# Patient Record
Sex: MALE | Race: BLACK OR AFRICAN AMERICAN | NOT HISPANIC OR LATINO | Employment: STUDENT | URBAN - METROPOLITAN AREA
[De-identification: names, ages, dates, MRNs, and addresses within clinical notes are randomized per-mention and may not be internally consistent; named-entity substitution may affect disease eponyms.]

---
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
## 2023-05-18 ENCOUNTER — HOSPITAL ENCOUNTER (EMERGENCY)
Facility: HOSPITAL | Age: 23
Discharge: HOME | End: 2023-05-18
Attending: EMERGENCY MEDICINE
Payer: MEDICAID

## 2023-05-18 VITALS
SYSTOLIC BLOOD PRESSURE: 127 MMHG | BODY MASS INDEX: 19.1 KG/M2 | TEMPERATURE: 98 F | WEIGHT: 126 LBS | RESPIRATION RATE: 18 BRPM | HEIGHT: 68 IN | DIASTOLIC BLOOD PRESSURE: 57 MMHG | OXYGEN SATURATION: 98 % | HEART RATE: 60 BPM

## 2023-05-18 DIAGNOSIS — S61.209A AVULSION OF FINGER, INITIAL ENCOUNTER: Primary | ICD-10-CM

## 2023-05-18 DIAGNOSIS — S69.90XA INJURY OF INDEX FINGER, INITIAL ENCOUNTER: ICD-10-CM

## 2023-05-18 PROCEDURE — 99284 EMERGENCY DEPT VISIT MOD MDM: CPT | Mod: ,,, | Performed by: EMERGENCY MEDICINE

## 2023-05-18 PROCEDURE — 90715 TDAP VACCINE 7 YRS/> IM: CPT | Performed by: PHYSICIAN ASSISTANT

## 2023-05-18 PROCEDURE — 99284 EMERGENCY DEPT VISIT MOD MDM: CPT

## 2023-05-18 PROCEDURE — 63600175 PHARM REV CODE 636 W HCPCS: Performed by: PHYSICIAN ASSISTANT

## 2023-05-18 PROCEDURE — 99284 PR EMERGENCY DEPT VISIT,LEVEL IV: ICD-10-PCS | Mod: ,,, | Performed by: EMERGENCY MEDICINE

## 2023-05-18 PROCEDURE — 90471 IMMUNIZATION ADMIN: CPT | Performed by: PHYSICIAN ASSISTANT

## 2023-05-18 RX ADMIN — TETANUS TOXOID, REDUCED DIPHTHERIA TOXOID AND ACELLULAR PERTUSSIS VACCINE, ADSORBED 0.5 ML: 5; 2.5; 8; 8; 2.5 SUSPENSION INTRAMUSCULAR at 06:05

## 2023-05-18 NOTE — Clinical Note
"Amador"Matt Bejarano was seen and treated in our emergency department on 5/18/2023.  He may return to work on 05/19/2023.       If you have any questions or concerns, please don't hesitate to call.      Nick Pizarro PA-C"

## 2023-05-18 NOTE — DISCHARGE INSTRUCTIONS
Change your dressings twice daily or if it becomes wet.  Apply topical antibiotic ointment whenever you change the dressing (Neosporin, Bacitracin, etc).  Take over-the-counter Tylenol and Ibuprofen as needed for pain.   Use the below contact information to follow-up with our Orthopedic department.     Follow-up with your primary care provider for further evaluation.     Return to the emergency room for new, worsening, or concerning symptoms.

## 2023-05-18 NOTE — ED NOTES
Amador Bejarano, a 23 y.o. male presents to the ED w/ complaint of lac to left index finger     Triage note:  Chief Complaint   Patient presents with    Laceration     L index finger  with knife     Review of patient's allergies indicates:   Allergen Reactions    Zithromax [azithromycin]      No past medical history on file.    LOC: Patient name and date of birth verified. The patient is awake, alert and aware of environment with an appropriate affect, the patient is oriented x 3 and speaking appropriately.   APPEARANCE: Patient resting comfortably, patient is clean and well groomed, patient's clothing is properly fastened.  SKIN: The skin is warm and dry, color consistent with ethnicity, patient has normal skin turgor and moist mucus membranes, skin intact, no breakdown or bruising noted.  MUSCULOSKELETAL: Patient moving all extremities well, no obvious swelling or deformities noted.   RESPIRATORY: Respirations are spontaneous, patient has a normal effort and rate, no accessory muscle use noted.  CARDIAC: Patient has a normal rate and rhythm, no periphreal edema noted, capillary refill < 3 seconds.  ABDOMEN: Soft and non tender to palpation, no distention noted. Bowel sounds present in all four quadrants.  NEUROLOGIC: Eyes open spontaneously, behavior appropriate to situation, follows commands, facial expression symmetrical, bilateral hand grasp equal and even, purposeful motor response noted, normal sensation in all extremities when touched with a finger.

## 2023-05-18 NOTE — ED PROVIDER NOTES
Encounter Date: 5/18/2023       History     Chief Complaint   Patient presents with    Laceration     L index finger  with knife     The history is provided by the patient and medical records. No  was used.     Amador Bejarano is a 23 y.o. male with no reported medical history presenting to the ED with the chief complaint of left index finger laceration.     Accidentally cut the tip of his L index finger with a knife while chopping vegetables about 2 hours PTA. Denies ROM deficits. No daily medications or blood thinner use. Denies other bodily injury. Does not recall his last tetanus.     Review of patient's allergies indicates:   Allergen Reactions    Zithromax [azithromycin]      No past medical history on file.  No past surgical history on file.  Family History   Problem Relation Age of Onset    Hypertension Paternal Grandmother      Social History     Tobacco Use    Smoking status: Never   Substance Use Topics    Alcohol use: Never    Drug use: Never     Review of Systems   Skin:  Positive for wound.     Physical Exam     Initial Vitals [05/18/23 1726]   BP Pulse Resp Temp SpO2   (!) 127/57 60 18 98.3 °F (36.8 °C) 98 %      MAP       --         Physical Exam    Constitutional: He appears well-developed and well-nourished. He is not diaphoretic. No distress.   HENT:   Head: Normocephalic and atraumatic.   Mouth/Throat: Oropharynx is clear and moist.   Eyes: EOM are normal. Pupils are equal, round, and reactive to light.   Neck:   Normal range of motion.  Cardiovascular:  Normal rate and regular rhythm.           Pulmonary/Chest: No respiratory distress.   Speaking full sentences without difficulty. No accessory muscle use.   Musculoskeletal:         General: Normal range of motion.      Cervical back: Normal range of motion.      Comments: Small avulsion injury involving radial aspect of distal phalanx of L index finger involving fingernail. Bleeding controlled. No exposed bone. Intact DIP,  PIP, MCP ROM.     Neurological: He is alert and oriented to person, place, and time.   Skin: Skin is warm and dry. No rash noted.     Left index finger:          ED Course   Procedures  Labs Reviewed - No data to display         Imaging Results    None          Medications   Tdap (BOOSTRIX) vaccine injection 0.5 mL (has no administration in time range)     Medical Decision Making:   History:   Old Medical Records: I decided to obtain old medical records.  Old Records Summarized: records from clinic visits and records from previous admission(s).  Initial Assessment:   23 y.o. male with no reported medical history presenting to the ED c/o L index finger avulsion injury.   Differential Diagnosis:   Avulsion injury, laceration, arterial injury, nerve injury. No exposed bone and do not suspect open fracture.   ED Management:  Tetanus updated. Clinical presentation consistent with zone 1 avulsion injury of left index finger. No exposed bone. No suture repair warranted at this time. Bleeding controlled. No immunosuppression or DM and do not feel prophylactic ABx needed at this time. Wound cleansed and dressed at bedside. Okay for outpatient follow-up in hand clinic. Wound care education provided. Patient expresses understanding and agreeable to the plan. Return to ED precautions given for new, worsening, or concerning symptoms. I have discussed the care of this patient with my supervising physician.                             Clinical Impression:   Final diagnoses:  [S61.209A] Avulsion of finger, initial encounter (Primary)  [S69.90XA] Injury of index finger, initial encounter        ED Disposition Condition    Discharge Stable          ED Prescriptions    None       Follow-up Information       Follow up With Specialties Details Why Contact Info Additional Information    [facility]   [address], [address]  [facility] [address] [phone] [facility] -  Main Building, 5th Floor Please park in [facility] and take [address] elevator             Nick Pizarro PA-C  05/18/23 7500

## 2023-08-22 ENCOUNTER — OFFICE VISIT (OUTPATIENT)
Dept: ORTHOPEDICS | Facility: CLINIC | Age: 23
End: 2023-08-22
Payer: MEDICAID

## 2023-08-22 VITALS
HEART RATE: 56 BPM | WEIGHT: 133.69 LBS | DIASTOLIC BLOOD PRESSURE: 69 MMHG | SYSTOLIC BLOOD PRESSURE: 109 MMHG | BODY MASS INDEX: 20.33 KG/M2

## 2023-08-22 DIAGNOSIS — S64.491A INJURY OF DIGITAL NERVE OF LEFT INDEX FINGER, INITIAL ENCOUNTER: Primary | ICD-10-CM

## 2023-08-22 DIAGNOSIS — S61.209A AVULSION OF FINGER, INITIAL ENCOUNTER: ICD-10-CM

## 2023-08-22 PROCEDURE — 3074F SYST BP LT 130 MM HG: CPT | Mod: CPTII,,,

## 2023-08-22 PROCEDURE — 99203 OFFICE O/P NEW LOW 30 MIN: CPT | Mod: S$PBB,,,

## 2023-08-22 PROCEDURE — 1159F MED LIST DOCD IN RCRD: CPT | Mod: CPTII,,,

## 2023-08-22 PROCEDURE — 1159F PR MEDICATION LIST DOCUMENTED IN MEDICAL RECORD: ICD-10-PCS | Mod: CPTII,,,

## 2023-08-22 PROCEDURE — 3078F PR MOST RECENT DIASTOLIC BLOOD PRESSURE < 80 MM HG: ICD-10-PCS | Mod: CPTII,,,

## 2023-08-22 PROCEDURE — 99203 PR OFFICE/OUTPT VISIT, NEW, LEVL III, 30-44 MIN: ICD-10-PCS | Mod: S$PBB,,,

## 2023-08-22 PROCEDURE — 99214 OFFICE O/P EST MOD 30 MIN: CPT | Mod: PBBFAC,PN

## 2023-08-22 PROCEDURE — 3078F DIAST BP <80 MM HG: CPT | Mod: CPTII,,,

## 2023-08-22 PROCEDURE — 3008F BODY MASS INDEX DOCD: CPT | Mod: CPTII,,,

## 2023-08-22 PROCEDURE — 99999 PR PBB SHADOW E&M-EST. PATIENT-LVL IV: ICD-10-PCS | Mod: PBBFAC,,,

## 2023-08-22 PROCEDURE — 99999 PR PBB SHADOW E&M-EST. PATIENT-LVL IV: CPT | Mod: PBBFAC,,,

## 2023-08-22 PROCEDURE — 3008F PR BODY MASS INDEX (BMI) DOCUMENTED: ICD-10-PCS | Mod: CPTII,,,

## 2023-08-22 PROCEDURE — 3074F PR MOST RECENT SYSTOLIC BLOOD PRESSURE < 130 MM HG: ICD-10-PCS | Mod: CPTII,,,

## 2023-08-22 RX ORDER — ALBUTEROL SULFATE 4 MG/1
4 TABLET, FILM COATED, EXTENDED RELEASE ORAL EVERY 12 HOURS
COMMUNITY

## 2023-08-22 NOTE — PROGRESS NOTES
SUBJECTIVE:      Chief Complaint: left index finger pain    History of Present Illness:  Patient is a 23 y.o. ambidextrous male who presents today with complaints of left index finger pain. Reports about 3 months ago he was chopping vegetables and he sliced a volar aspect of his left index finger. He did not receive sutures. Reports he was doing wound care and reports continued pain today, located to volar index finger. Pain worsened when he comes into contact with objects. He has not tried anything to help with pain.     The patient is a/an cooker.    Onset of symptoms/DOI was 3 months prior.    Symptoms are aggravated by contact with index finger and in am.    Symptoms are alleviated by  none .    Symptoms consist of pain.    The patient rates their pain as a 0/10.    Attempted treatment(s) and/or interventions include activity modifications, rest.     The patient denies any fevers, chills, N/V, D/C and presents for evaluation.       History reviewed. No pertinent past medical history.  History reviewed. No pertinent surgical history.  Review of patient's allergies indicates:   Allergen Reactions    Zithromax [azithromycin]      Social History     Social History Narrative    Pt lives with his mother, 2 siblings and 1 dog. 0 smokers in the home     Family History   Problem Relation Age of Onset    Hypertension Paternal Grandmother          Current Outpatient Medications:     albuterol sulfate (VOSPIRE ER) 4 mg 12 hr tablet, Take 4 mg by mouth every 12 (twelve) hours., Disp: , Rfl:       Review of Systems:  Constitutional: no fever or chills  Eyes: no visual changes  ENT: no nasal congestion or sore throat  Respiratory: no cough or shortness of breath  Cardiovascular: no chest pain  Gastrointestinal: no nausea or vomiting, tolerating diet  Musculoskeletal: pain    OBJECTIVE:      Vital Signs (Most Recent):  Vitals:    08/22/23 1054   BP: 109/69   Pulse: (!) 56   Weight: 60.6 kg (133 lb 11.3 oz)     Body mass index  is 20.33 kg/m².      Physical Exam:  Constitutional: The patient appears well-developed and well-nourished. No distress.   Skin: No lesions appreciated  Head: Normocephalic and atraumatic.   Nose: Nose normal.   Ears: No deformities seen  Eyes: Conjunctivae and EOM are normal.   Neck: No tracheal deviation present.   Cardiovascular: Normal rate and intact distal pulses.    Pulmonary/Chest: Effort normal. No respiratory distress.   Abdominal: There is no guarding.   Neurological: The patient is alert.   Psychiatric: The patient has a normal mood and affect.     Left Hand/Wrist Examination:    Observation/Inspection:  Swelling  none    Deformity  none  Discoloration  none     Scars   + adjacent to index finger nailbed without infectious signs    Atrophy  none    HAND/WRIST EXAMINATION:  Finkelstein's Test   Neg  WHAT Test    Neg  Snuff box tenderness   Neg  Nguyen's Test    Neg  Hook of Hamate Tenderness  Neg  CMC grind    Neg  Circumduction test   Neg  TTP at distal volar index finger with deep palpation    Neurovascular Exam:  Digits WWP, brisk CR < 3s throughout  NVI motor/LTS to M/R/U nerves, radial pulse 2+  Tinel's Test - Carpal Tunnel  Neg  Tinel's Test - Cubital Tunnel  Neg  Phalen's Test    Neg  Median Nerve Compression Test Neg    ROM hand full, painless    ROM wrist full, painless    ROM elbow full, painless    Abdomen not guarded  Respirations nonlabored  Perfusion intact    Diagnostic Results:     No imaging available today    ASSESSMENT/PLAN:      1. Injury of digital nerve of left index finger, initial encounter        2. Avulsion of finger, initial encounter  Ambulatory referral/consult to Orthopedics    X-Ray Hand Complete Left    Ambulatory referral/consult to Physical/Occupational Therapy    Ambulatory referral/consult to Hand Surgery          I made the decision to obtain old records of the patient including previous notes and imaging. If new imaging was ordered today of the extremity or  extremities evaluated.    We discussed at length different treatment options including conservative vs surgical management. These include anti-inflammatories, acetaminophen, rest, ice, heat, formal physical therapy including strengthening and stretching exercises, home exercise programs, injections, and finally surgical intervention.      Patient with continued pain to left index finger after sustaining a laceration injury roughly 3 months ago.  Unfortunately he did not get x-rays before his appointment today.  Order placed, he was instructed to get these on his way out to rule out fracture.  We will also try him on occupational therapy.  encouraged patient to work on desensitization to distal index finger with different sensations such as washing clock, sand, etc..  We will also refer him to Hand surgery as he has Medicaid and if no improvement will be a few months till he is able to be seen.  Referral placed.    Follow up: hand surgery  Xrays needed: none     All of the patient's questions were answered and the patient will contact us if they have any questions or concerns in the interim.